# Patient Record
Sex: FEMALE | Employment: FULL TIME | URBAN - METROPOLITAN AREA
[De-identification: names, ages, dates, MRNs, and addresses within clinical notes are randomized per-mention and may not be internally consistent; named-entity substitution may affect disease eponyms.]

---

## 2021-01-29 ENCOUNTER — OFFICE VISIT (OUTPATIENT)
Dept: FAMILY MEDICINE | Facility: CLINIC | Age: 51
End: 2021-01-29
Payer: COMMERCIAL

## 2021-01-29 VITALS — SYSTOLIC BLOOD PRESSURE: 94 MMHG | DIASTOLIC BLOOD PRESSURE: 60 MMHG

## 2021-01-29 DIAGNOSIS — R21 RASH AND OTHER NONSPECIFIC SKIN ERUPTION: Primary | ICD-10-CM

## 2021-01-29 DIAGNOSIS — L85.8 KERATOSIS PILARIS: ICD-10-CM

## 2021-01-29 DIAGNOSIS — L72.0 EPIDERMAL CYST: ICD-10-CM

## 2021-01-29 PROCEDURE — 99203 OFFICE O/P NEW LOW 30 MIN: CPT | Mod: 25 | Performed by: PHYSICIAN ASSISTANT

## 2021-01-29 PROCEDURE — 88342 IMHCHEM/IMCYTCHM 1ST ANTB: CPT | Performed by: PATHOLOGY

## 2021-01-29 PROCEDURE — 11102 TANGNTL BX SKIN SINGLE LES: CPT | Performed by: PHYSICIAN ASSISTANT

## 2021-01-29 PROCEDURE — 88305 TISSUE EXAM BY PATHOLOGIST: CPT | Performed by: PATHOLOGY

## 2021-01-29 PROCEDURE — 88313 SPECIAL STAINS GROUP 2: CPT | Performed by: PATHOLOGY

## 2021-01-29 RX ORDER — MULTIPLE VITAMINS W/ MINERALS TAB 9MG-400MCG
1 TAB ORAL DAILY
COMMUNITY

## 2021-01-29 NOTE — LETTER
1/29/2021         RE: Natalia Monroy  [address]  [place] [address]        Dear Colleague,    Thank you for referring your patient, Natalia Monroy, to the [facility]. Please see a copy of my visit note below.    HPI:  Natalia Monroy is a 50 year old female patient here today for light and darkspots on extremities and trunk .  Patient states this has been present for years.  Patient reports the following symptoms: none .  Patient reports the following previous treatments: none.  Patient reports the following modifying factors: none.  Associated symptoms: none.  Patient has no other skin complaints today.  Remainder of the HPI, Meds, PMH, Allergies, FH, and SH was reviewed in chart.      History reviewed. No pertinent past medical history.    History reviewed. No pertinent surgical history.     History reviewed. No pertinent family history.    Social History     Socioeconomic History     Marital status: Single     Spouse name: Not on file     Number of children: Not on file     Years of education: Not on file     Highest education level: Not on file   Occupational History     Not on file   Social Needs     Financial resource strain: Not on file     Food insecurity     Worry: Not on file     Inability: Not on file     Transportation needs     Medical: Not on file     Non-medical: Not on file   Tobacco Use     Smoking status: Never Smoker     Smokeless tobacco: Never Used   Substance and Sexual Activity     Alcohol use: Yes     Drug use: Never     Sexual activity: Not on file   Lifestyle     Physical activity     Days per week: Not on file     Minutes per session: Not on file     Stress: Not on file   Relationships     Social connections     Talks on phone: Not on file     Gets together: Not on file     Attends Sikhism service: Not on file     Active member of club or organization: Not on file     Attends meetings of clubs or organizations: Not on file     Relationship status: Not  on file     Intimate partner violence     Fear of current or ex partner: Not on file     Emotionally abused: Not on file     Physically abused: Not on file     Forced sexual activity: Not on file   Other Topics Concern     Parent/sibling w/ CABG, MI or angioplasty before 65F 55M? Not Asked   Social History Narrative     Not on file       Outpatient Encounter Medications as of 1/29/2021   Medication Sig Dispense Refill     multivitamin w/minerals (MULTI-VITAMIN) tablet Take 1 tablet by mouth daily       omeprazole (PRILOSEC) 20 MG DR capsule Take 20 mg by mouth daily       No facility-administered encounter medications on file as of 1/29/2021.        Review Of Systems:  Skin: spots  Eyes: negative  Ears/Nose/Throat: negative  Respiratory: No shortness of breath, dyspnea on exertion, cough, or hemoptysis  Cardiovascular: negative  Gastrointestinal: negative  Genitourinary: negative  Musculoskeletal: negative  Neurologic: negative  Psychiatric: negative  Hematologic/Lymphatic/Immunologic: negative  Endocrine: negative      Objective:     BP 94/60   Eyes: Conjunctivae/lids: Normal   ENT: Lips:  Normal  MSK: Normal  Cardiovascular: Peripheral edema none  Pulm: Breathing Normal  Neuro/Psych: Orientation: A/O x 3 Normal; Mood/Affect: Normal, NAD, WDWN  Pt accompanied by: self  Following areas examined: face, neck, shoulders, UE, ventral and lateral thighs  Payne skin type:v   Findings:  Wd smooth depigmented macules scattered on LE and UE  Brown scaly 1.0mm papules on thighs and lateral arms  Soft mobile smooth subcutaneous nodule with central opening on left axilla 0.7cm  Assessment and Plan:  1) dilated pore of sanjeev vs epidermal cyst  Discussed the etiology and benign course of lesion. Discussed treatment options including watching and monitoring vs vertical excision. Lesion can recur even after vertical excision.   The extent of scar, wound care, activity restriction, complications,  and healing time were  discussed with patient. All questions were answered and an informed decision was made regarding treatment.     2) Rash and nonspecific skin eruption  TANGENTIAL BIOPSY:  After consent, anesthesia with LEC and prep, tangential biopsy performed.  No complications and routine wound care.  May grow back and will get a scar. Based on lesion type may need to completely remove lesion. Patient will be notified in 7-10 days of results. Wound care directions given.    3) Keratosis Pilaris:    Is a genetic rash that is considered common. Prevalent on the arms, upper legs, and  cheeks, it looks like small bumps. There is no cure, but there are some topical creams  that will help smooth out the bumps.   Wash body daily with a gentle cleanser( Dove, Cetaphil, or Vanicream) or just friction and water (use soap on underarms, groin, and feet).  Apply a thick emollient at least once a day. If showering apply emollient within 2-3 minutes of showering.   Consider AmLactin or Eucerin Plus twice daily(can purchase over-the-counter) as a moisturizer to affected area.  Begin Tretinoin to affected area nightly. Can be very drying so make sure to moisturize.  Okay to use topical steroid on inflamed areas 2x a day for a short period of time (2-3 days). This can calm down some of the redness.  Side effects of topical steroids including but not limited to atrophy (skin thinning), striae (stretch marks) telangiectasias, steroid acne, and others. Do not apply to normal skin. Do not apply to discolored skin that does not have rash present.   Consider over the counter DERMAdoctor high potency daily body peel to affected areas.         Follow up in based on biopsy results        Again, thank you for allowing me to participate in the care of your patient.        Sincerely,        Mag Flowers PA-C

## 2021-01-29 NOTE — PROGRESS NOTES
HPI:  Natalia Monroy is a 50 year old female patient here today for light and darkspots on extremities and trunk .  Patient states this has been present for years.  Patient reports the following symptoms: none .  Patient reports the following previous treatments: none.  Patient reports the following modifying factors: none.  Associated symptoms: none.  Patient has no other skin complaints today.  Remainder of the HPI, Meds, PMH, Allergies, FH, and SH was reviewed in chart.      History reviewed. No pertinent past medical history.    History reviewed. No pertinent surgical history.     History reviewed. No pertinent family history.    Social History     Socioeconomic History     Marital status: Single     Spouse name: Not on file     Number of children: Not on file     Years of education: Not on file     Highest education level: Not on file   Occupational History     Not on file   Social Needs     Financial resource strain: Not on file     Food insecurity     Worry: Not on file     Inability: Not on file     Transportation needs     Medical: Not on file     Non-medical: Not on file   Tobacco Use     Smoking status: Never Smoker     Smokeless tobacco: Never Used   Substance and Sexual Activity     Alcohol use: Yes     Drug use: Never     Sexual activity: Not on file   Lifestyle     Physical activity     Days per week: Not on file     Minutes per session: Not on file     Stress: Not on file   Relationships     Social connections     Talks on phone: Not on file     Gets together: Not on file     Attends Taoist service: Not on file     Active member of club or organization: Not on file     Attends meetings of clubs or organizations: Not on file     Relationship status: Not on file     Intimate partner violence     Fear of current or ex partner: Not on file     Emotionally abused: Not on file     Physically abused: Not on file     Forced sexual activity: Not on file   Other Topics Concern     Parent/sibling w/ CABG, MI  or angioplasty before 65F 55M? Not Asked   Social History Narrative     Not on file       Outpatient Encounter Medications as of 1/29/2021   Medication Sig Dispense Refill     multivitamin w/minerals (MULTI-VITAMIN) tablet Take 1 tablet by mouth daily       omeprazole (PRILOSEC) 20 MG DR capsule Take 20 mg by mouth daily       No facility-administered encounter medications on file as of 1/29/2021.        Review Of Systems:  Skin: spots  Eyes: negative  Ears/Nose/Throat: negative  Respiratory: No shortness of breath, dyspnea on exertion, cough, or hemoptysis  Cardiovascular: negative  Gastrointestinal: negative  Genitourinary: negative  Musculoskeletal: negative  Neurologic: negative  Psychiatric: negative  Hematologic/Lymphatic/Immunologic: negative  Endocrine: negative      Objective:     BP 94/60   Eyes: Conjunctivae/lids: Normal   ENT: Lips:  Normal  MSK: Normal  Cardiovascular: Peripheral edema none  Pulm: Breathing Normal  Neuro/Psych: Orientation: A/O x 3 Normal; Mood/Affect: Normal, NAD, WDWN  Pt accompanied by: self  Following areas examined: face, neck, shoulders, UE, ventral and lateral thighs  Payne skin type:v   Findings:  Wd smooth depigmented macules scattered on LE and UE  Brown scaly 1.0mm papules on thighs and lateral arms  Soft mobile smooth subcutaneous nodule with central opening on left axilla 0.7cm  Assessment and Plan:  1) dilated pore of sanjeev vs epidermal cyst  Discussed the etiology and benign course of lesion. Discussed treatment options including watching and monitoring vs vertical excision. Lesion can recur even after vertical excision.   The extent of scar, wound care, activity restriction, complications,  and healing time were discussed with patient. All questions were answered and an informed decision was made regarding treatment.     2) Rash and nonspecific skin eruption  TANGENTIAL BIOPSY:  After consent, anesthesia with LEC and prep, tangential biopsy performed.  No  complications and routine wound care.  May grow back and will get a scar. Based on lesion type may need to completely remove lesion. Patient will be notified in 7-10 days of results. Wound care directions given.    3) Keratosis Pilaris:    Is a genetic rash that is considered common. Prevalent on the arms, upper legs, and  cheeks, it looks like small bumps. There is no cure, but there are some topical creams  that will help smooth out the bumps.   Wash body daily with a gentle cleanser( Dove, Cetaphil, or Vanicream) or just friction and water (use soap on underarms, groin, and feet).  Apply a thick emollient at least once a day. If showering apply emollient within 2-3 minutes of showering.   Consider AmLactin or Eucerin Plus twice daily(can purchase over-the-counter) as a moisturizer to affected area.  Begin Tretinoin to affected area nightly. Can be very drying so make sure to moisturize.  Okay to use topical steroid on inflamed areas 2x a day for a short period of time (2-3 days). This can calm down some of the redness.  Side effects of topical steroids including but not limited to atrophy (skin thinning), striae (stretch marks) telangiectasias, steroid acne, and others. Do not apply to normal skin. Do not apply to discolored skin that does not have rash present.   Consider over the counter DERMAdoctor high potency daily body peel to affected areas.         Follow up in based on biopsy results

## 2021-01-29 NOTE — PATIENT INSTRUCTIONS
Wound Care Instructions   Thigh    FOR SUPERFICIAL WOUNDS     [facility] [phone]    [facility] [phone]          AFTER 24 HOURS YOU SHOULD REMOVE THE BANDAGE AND BEGIN DAILY DRESSING CHANGES AS FOLLOWS:     1) Remove Dressing.     2) Clean and dry the area with tap water using a Q-tip or sterile gauze pad.     3) Apply Vaseline, Aquaphor, Polysporin ointment or Bacitracin ointment over entire wound.  Do NOT use Neosporin ointment.     4) Cover the wound with a band-aid, or a sterile non-stick gauze pad and micropore paper tape      REPEAT THESE INSTRUCTIONS AT LEAST ONCE A DAY UNTIL THE WOUND HAS COMPLETELY HEALED.    It is an old wives tale that a wound heals better when it is exposed to air and allowed to dry out. The wound will heal faster with a better cosmetic result if it is kept moist with ointment and covered with a bandage.    **Do not let the wound dry out.**      Supplies Needed:      *Cotton tipped applicators (Q-tips)    *Polysporin Ointment or Bacitracin Ointment (NOT NEOSPORIN)    *Band-aids or non-stick gauze pads and micropore paper tape.      PATIENT INFORMATION:    During the healing process you will notice a number of changes. All wounds develop a small halo of redness surrounding the wound.  This means healing is occurring. Severe itching with extensive redness usually indicates sensitivity to the ointment or bandage tape used to dress the wound.  You should call our office if this develops.      Swelling  and/or discoloration around your surgical site is common, particularly when performed around the eye.    All wounds normally drain.  The larger the wound the more drainage there will be.  After 7-10 days, you will notice the wound beginning to shrink and new skin will begin to grow.  The wound is healed when you can see skin has formed over the entire area.  A healed wound has a healthy, shiny look to the surface and is red to dark pink in  color to normalize.  Wounds may take approximately 4-6 weeks to heal.  Larger wounds may take 6-8 weeks.  After the wound is healed you may discontinue dressing changes.    You may experience a sensation of tightness as your wound heals. This is normal and will gradually subside.    Your healed wound may be sensitive to temperature changes. This sensitivity improves with time, but if you re having a lot of discomfort, try to avoid temperature extremes.    Patients frequently experience itching after their wound appears to have healed because of the continue healing under the skin.  Plain Vaseline will help relieve the itching.        POSSIBLE COMPLICATIONS    BLEEDIN. Leave the bandage in place.  2. Use tightly rolled up gauze or a cloth to apply direct pressure over the bandage for 30  minutes.  3. Reapply pressure for an additional 30 minutes if necessary  4. Use additional gauze and tape to maintain pressure once the bleeding has stopped.      Proper skin care from [facility]:    -Eliminate harsh soaps as they strip the natural oils from the skin, often resulting in dry itchy skin ( i.e. Dial, Zest, Liberian Spring)  -Use mild soaps such as Cetaphil or Dove Sensitive Skin in the shower. You do not need to use soap on arms, legs, and trunk every time you shower unless visibly soiled.   -Avoid hot or cold showers.  -After showering, lightly dry off and apply moisturizing within 2-3 minutes. This will help trap moisture in the skin.   -Aggressive use of a moisturizer at least 1-2 times a day to the entire body (including -Vanicream, Cetaphil, Aquaphor or Cerave) and moisturize hands after every washing.  -We recommend using moisturizers that come in a tub that needs to be scooped out, not a pump. This has more of an oil base. It will hold moisture in your skin much better than a water base moisturizer. The above recommended are non-pore clogging.      Wear a sunscreen with at least SPF 30 on your face,  ears, neck and V of the chest daily. Wear sunscreen on other areas of the body if those areas are exposed to the sun throughout the day. Sunscreens can contain physical and/or chemical blockers. Physical blockers are less likely to clog pores, these include zinc oxide and titanium dioxide. Reapply every two hour and after swimming. Sunscreen examples include Neutrogena, CeraVe, Blue Lizard, Elta MD and many others.    UV radiation  UVA radiation remains constant throughout the day and throughout the year. It is a longer wavelength than UVB and therefore penetrates deeper into the skin leading to immediate and delayed tanning, photoaging, and skin cancer. 70-80% of UVA and UVB radiation occurs between the hours of 10am-2pm.  UVB radiation  UVB radiation causes the most harmful effects and is more significant during the summer months. However, snow and ice can reflect UVB radiation leading to skin damage during the winter months as well. UVB radiation is responsible for tanning, burning, inflammation, delayed erythema (pinkness), pigmentation (brown spots), and skin cancer.     I recommend self monthly full body exams and yearly full body exams with a dermatology provider. If you develop a new or changing lesion please follow up for examination. Most skin cancers are pink and scaly or pink and pearly. However, we do see blue/brown/black skin cancers.  Consider the ABCDEs of melanoma when giving yourself your monthly full body exam ( don't forget the groin, buttocks, feet, toes, etc). A-asymmetry, B-borders, C-color, D-diameter, E-elevation or evolving. If you see any of these changes please follow up in clinic. If you cannot see your back I recommend purchasing a hand held mirror to use with a larger wall mirror.        Questions about the cost of your care?    (13792-37900) codes for excision of cyst on left axilla    Please contact our consumer price line at [phone] ([email or web address]) or visit [email or web address] for  an estimate on the care you received today or upcoming procedures.  To determine pricing, you will be asked for:    Name  Date of birth  Phone number  Date of upcoming office visit or procedure  If insured, policy information  Name or CPT (common procedural terminology) code of test or procedure    For information on how your insurance will cover you visit, please call the customer service number on your insurance card.      Keratosis Pilaris:    Is a genetic rash that is considered common. Prevalent on the arms, upper legs, and  cheeks, it looks like small bumps. There is no cure, but there are some topical creams  that will help smooth out the bumps.   Wash body daily with a gentle cleanser( Dove, Cetaphil, or Vanicream) or just friction and water (use soap on underarms, groin, and feet).  Apply a thick emollient at least once a day. If showering apply emollient within 2-3 minutes of showering.   Consider AmLactin or Eucerin Plus twice daily(can purchase over-the-counter) as a moisturizer to affected area.  Begin Tretinoin to affected area nightly. Can be very drying so make sure to moisturize.  Okay to use topical steroid on inflamed areas 2x a day for a short period of time (2-3 days). This can calm down some of the redness.  Side effects of topical steroids including but not limited to atrophy (skin thinning), striae (stretch marks) telangiectasias, steroid acne, and others. Do not apply to normal skin. Do not apply to discolored skin that does not have rash present.   Consider over the counter DERMAdoctor high potency daily body peel to affected areas.

## 2021-02-05 ENCOUNTER — TELEPHONE (OUTPATIENT)
Dept: DERMATOLOGY | Facility: CLINIC | Age: 51
End: 2021-02-05

## 2021-02-05 LAB — COPATH REPORT: NORMAL

## 2021-02-05 NOTE — TELEPHONE ENCOUNTER
Snail mail letter sent:    Dear Ms. 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    It is benign and no treatment is needed. If treatment is desired it is very difficult to treat and insurance may not cover the cost.     There are many options to try and none work that well.    Options include:  Topical steroid, topical tretinoin, liquid nitrogen, and laser.     We recommend [facility] in [place].    Thank you,      [facility]

## 2021-02-05 NOTE — TELEPHONE ENCOUNTER
Called and spoke to patient.    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    Dx is benign and no treatment is needed.     If treatment is desired it is very difficult to treat- insurance may not cover the cost.     Options to try (none work that well) are:    -topical steroid,   -topical tretinoin  -liquid nitrogen  -laser    For laser you can call [facility] in [place].    Patient would like a letter sent via snail mail w/ all information.    Patient voiced understanding.    Lanre RN-BSN-PHN  [facility]  [phone]

## 2021-02-05 NOTE — TELEPHONE ENCOUNTER
----- Message from Mag Flowers PA-C sent at 2/5/2021  2:40 PM CST -----  Biopsy of left thigh shows idiopathic guttate hypomelanosis ( loss of pigment due to chronically sun exposed skin)  It is benign and no treatment is needed. If treatment is desired it is very difficult to treat.insurance may not cover the cost either. There are many options to try and none work that well.    Options:  Topical steroid, topic tretinoin, liquid nitrogen, and laser

## 2021-02-05 NOTE — LETTER
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    It is benign and no treatment is needed. If treatment is desired it is very difficult to treat and insurance may not cover the cost.     There are many options to try and none work that well.    Options include:  Topical steroid, topical tretinoin, liquid nitrogen, and laser.     We recommend [facility] in [place].    Thank you,      [facility]